# Patient Record
Sex: FEMALE | Race: WHITE | NOT HISPANIC OR LATINO
[De-identification: names, ages, dates, MRNs, and addresses within clinical notes are randomized per-mention and may not be internally consistent; named-entity substitution may affect disease eponyms.]

---

## 2017-10-04 PROBLEM — Z00.00 ENCOUNTER FOR PREVENTIVE HEALTH EXAMINATION: Status: ACTIVE | Noted: 2017-10-04

## 2017-10-16 ENCOUNTER — APPOINTMENT (OUTPATIENT)
Dept: VASCULAR SURGERY | Facility: CLINIC | Age: 82
End: 2017-10-16

## 2018-04-23 ENCOUNTER — MEDICATION RENEWAL (OUTPATIENT)
Age: 83
End: 2018-04-23

## 2018-04-30 ENCOUNTER — APPOINTMENT (OUTPATIENT)
Dept: VASCULAR SURGERY | Facility: CLINIC | Age: 83
End: 2018-04-30
Payer: MEDICARE

## 2018-04-30 VITALS — WEIGHT: 170 LBS | HEIGHT: 67 IN | BODY MASS INDEX: 26.68 KG/M2

## 2018-04-30 DIAGNOSIS — M79.669 PAIN IN UNSPECIFIED LOWER LEG: ICD-10-CM

## 2018-04-30 DIAGNOSIS — I99.9 UNSPECIFIED DISORDER OF CIRCULATORY SYSTEM: ICD-10-CM

## 2018-04-30 DIAGNOSIS — Z87.891 PERSONAL HISTORY OF NICOTINE DEPENDENCE: ICD-10-CM

## 2018-04-30 DIAGNOSIS — Z86.39 PERSONAL HISTORY OF OTHER ENDOCRINE, NUTRITIONAL AND METABOLIC DISEASE: ICD-10-CM

## 2018-04-30 DIAGNOSIS — Z78.9 OTHER SPECIFIED HEALTH STATUS: ICD-10-CM

## 2018-04-30 DIAGNOSIS — Z72.3 LACK OF PHYSICAL EXERCISE: ICD-10-CM

## 2018-04-30 DIAGNOSIS — Z80.3 FAMILY HISTORY OF MALIGNANT NEOPLASM OF BREAST: ICD-10-CM

## 2018-04-30 PROCEDURE — 93926 LOWER EXTREMITY STUDY: CPT

## 2018-04-30 PROCEDURE — 99213 OFFICE O/P EST LOW 20 MIN: CPT

## 2018-04-30 RX ORDER — ASPIRIN 81 MG/1
81 TABLET, COATED ORAL
Refills: 0 | Status: ACTIVE | COMMUNITY

## 2018-11-13 ENCOUNTER — MEDICATION RENEWAL (OUTPATIENT)
Age: 83
End: 2018-11-13

## 2019-06-10 ENCOUNTER — APPOINTMENT (OUTPATIENT)
Dept: VASCULAR SURGERY | Facility: CLINIC | Age: 84
End: 2019-06-10
Payer: MEDICARE

## 2019-06-10 VITALS — SYSTOLIC BLOOD PRESSURE: 104 MMHG | HEART RATE: 85 BPM | DIASTOLIC BLOOD PRESSURE: 75 MMHG

## 2019-06-10 VITALS — DIASTOLIC BLOOD PRESSURE: 71 MMHG | SYSTOLIC BLOOD PRESSURE: 144 MMHG

## 2019-06-10 PROCEDURE — 99213 OFFICE O/P EST LOW 20 MIN: CPT

## 2019-06-11 NOTE — PHYSICAL EXAM
[Normal Breath Sounds] : Normal breath sounds [Carotid Bruits] : carotid bruit  [JVD] : no jugular venous distention  [Normal Rate and Rhythm] : normal rate and rhythm [Alert] : alert [2+] : left 2+ [Oriented to Person] : oriented to person [Oriented to Place] : oriented to place [Oriented to Time] : oriented to time [de-identified] : appropriate [de-identified] : bilateral hands warm and pink [de-identified] : Awake and Alert

## 2019-06-11 NOTE — ASSESSMENT
[FreeTextEntry1] : 87 yo female s/p a left subclavian stent placement for symptomatic subclavian steal. She reports that she has pain in her left arm with use and frequent dizziness. She has normal pulses in the left arm and her blood pressure is greater in her left arm than the right. Clinically her stent appears to be patent. \par \par She will undergo a carotid duplex to evaluate for antegrade flow in the vertebral and patency of the stent.\par Continue ASA and Plavix\par Follow up when the results of the u/s are available.

## 2019-06-11 NOTE — HISTORY OF PRESENT ILLNESS
[FreeTextEntry1] : 89 yo female s/p a left subclavian stent for symptomatic steal syndrome. She reports that she has pain in her left arm while knitting. She also reports dizziness. She continue to take Aspirin and Plavix as described.

## 2019-06-11 NOTE — REVIEW OF SYSTEMS
[Fever] : no fever [Chills] : no chills [Limb Pain] : no limb pain [Limb Swelling] : no limb swelling [Feelings Of Weakness] : feelings of weakness

## 2019-07-08 ENCOUNTER — APPOINTMENT (OUTPATIENT)
Dept: VASCULAR SURGERY | Facility: CLINIC | Age: 84
End: 2019-07-08
Payer: MEDICARE

## 2019-07-08 VITALS — DIASTOLIC BLOOD PRESSURE: 70 MMHG | HEART RATE: 89 BPM | SYSTOLIC BLOOD PRESSURE: 132 MMHG

## 2019-07-08 VITALS — DIASTOLIC BLOOD PRESSURE: 70 MMHG | SYSTOLIC BLOOD PRESSURE: 160 MMHG

## 2019-07-08 PROCEDURE — 93882 EXTRACRANIAL UNI/LTD STUDY: CPT

## 2019-07-08 PROCEDURE — 99213 OFFICE O/P EST LOW 20 MIN: CPT

## 2019-07-08 NOTE — PHYSICAL EXAM
[Carotid Bruits] : carotid bruit  [Normal Breath Sounds] : Normal breath sounds [Normal Rate and Rhythm] : normal rate and rhythm [2+] : right 2+ [Oriented to Person] : oriented to person [Alert] : alert [Oriented to Place] : oriented to place [Oriented to Time] : oriented to time [JVD] : no jugular venous distention  [de-identified] : Awake and Alert [de-identified] : bilateral hands warm and pink [de-identified] : appropriate

## 2019-07-08 NOTE — HISTORY OF PRESENT ILLNESS
[FreeTextEntry1] : 87 yo female s/p a left subclavian stent for symptomatic steal syndrome. She has a known occlusion of the right ICA. She reports that she has pain in her left arm while knitting. She also reports dizziness. She continue to take Aspirin and Plavix as described. She is here for a left carotid duplex.

## 2019-07-08 NOTE — DATA REVIEWED
[FreeTextEntry1] : Carotid duplex - left -  widely patent subclavian stent , antegrade vertebral flow, moderate stenosis ICA

## 2019-07-08 NOTE — REVIEW OF SYSTEMS
[Dizziness] : dizziness [Limb Weakness] : limb weakness [Feelings Of Weakness] : feelings of weakness [Fever] : no fever [Chills] : no chills [Limb Pain] : no limb pain [Limb Swelling] : no limb swelling

## 2019-07-08 NOTE — ASSESSMENT
[FreeTextEntry1] : 89 yo female s/p a left subclavian stent placement for symptomatic subclavian steal. She reports that she has continued pain in her left arm with use and frequent dizziness. She has normal pulses in the left arm and her blood pressure is greater in her left arm than the right. Clinically her stent appears to be patent. She underwent a carotid duplex which demonstrated a widely patent stent with antegrade vertebral flow. I do not think there is an indication to intervene at this time\par \par \par Continue ASA and Plavix\par Follow up in 6 months

## 2020-01-06 ENCOUNTER — APPOINTMENT (OUTPATIENT)
Dept: VASCULAR SURGERY | Facility: CLINIC | Age: 85
End: 2020-01-06

## 2021-06-22 ENCOUNTER — NON-APPOINTMENT (OUTPATIENT)
Age: 86
End: 2021-06-22

## 2021-06-22 DIAGNOSIS — G45.8 OTHER TRANSIENT CEREBRAL ISCHEMIC ATTACKS AND RELATED SYNDROMES: ICD-10-CM

## 2021-07-28 ENCOUNTER — APPOINTMENT (OUTPATIENT)
Dept: VASCULAR SURGERY | Facility: CLINIC | Age: 86
End: 2021-07-28

## 2022-09-28 ENCOUNTER — TRANSCRIPTION ENCOUNTER (OUTPATIENT)
Age: 87
End: 2022-09-28

## 2023-02-18 ENCOUNTER — TRANSCRIPTION ENCOUNTER (OUTPATIENT)
Age: 88
End: 2023-02-18

## 2023-03-28 ENCOUNTER — TRANSCRIPTION ENCOUNTER (OUTPATIENT)
Age: 88
End: 2023-03-28

## 2023-03-29 ENCOUNTER — TRANSCRIPTION ENCOUNTER (OUTPATIENT)
Age: 88
End: 2023-03-29

## 2023-03-30 ENCOUNTER — APPOINTMENT (OUTPATIENT)
Dept: CARE COORDINATION | Facility: HOME HEALTH | Age: 88
End: 2023-03-30
Payer: MEDICARE

## 2023-03-30 VITALS
SYSTOLIC BLOOD PRESSURE: 140 MMHG | HEART RATE: 70 BPM | OXYGEN SATURATION: 98 % | DIASTOLIC BLOOD PRESSURE: 60 MMHG | RESPIRATION RATE: 16 BRPM

## 2023-03-30 DIAGNOSIS — Z86.73 PERSONAL HISTORY OF TRANSIENT ISCHEMIC ATTACK (TIA), AND CEREBRAL INFARCTION W/OUT RESIDUAL DEFICITS: ICD-10-CM

## 2023-03-30 DIAGNOSIS — F02.80 DEMENTIA WITH LEWY BODIES: ICD-10-CM

## 2023-03-30 DIAGNOSIS — G31.83 DEMENTIA WITH LEWY BODIES: ICD-10-CM

## 2023-03-30 PROCEDURE — 99495 TRANSJ CARE MGMT MOD F2F 14D: CPT

## 2023-03-31 PROBLEM — Z86.73 HISTORY OF CEREBROVASCULAR ACCIDENT: Status: RESOLVED | Noted: 2023-03-31 | Resolved: 2023-03-31

## 2023-03-31 PROBLEM — G31.83: Status: RESOLVED | Noted: 2023-03-31 | Resolved: 2023-03-31

## 2023-03-31 RX ORDER — GABAPENTIN 300 MG/1
300 CAPSULE ORAL
Refills: 0 | Status: COMPLETED | COMMUNITY
End: 2023-03-31

## 2023-03-31 RX ORDER — CLOPIDOGREL BISULFATE 75 MG/1
75 TABLET, FILM COATED ORAL DAILY
Qty: 30 | Refills: 6 | Status: COMPLETED | COMMUNITY
Start: 2020-02-20 | End: 2023-03-31

## 2023-03-31 RX ORDER — CLOPIDOGREL BISULFATE 75 MG/1
75 TABLET, FILM COATED ORAL DAILY
Qty: 30 | Refills: 6 | Status: COMPLETED | COMMUNITY
Start: 2018-11-13 | End: 2023-03-31

## 2023-03-31 RX ORDER — ACETAMINOPHEN 325 MG/1
325 TABLET, FILM COATED ORAL
Refills: 0 | Status: COMPLETED | COMMUNITY
End: 2023-03-31

## 2023-03-31 RX ORDER — SENNOSIDES 8.6 MG
8.6 TABLET ORAL
Refills: 0 | Status: COMPLETED | COMMUNITY
End: 2023-03-31

## 2023-03-31 RX ORDER — OXYCODONE HYDROCHLORIDE 5 MG/1
5 CAPSULE ORAL
Refills: 0 | Status: COMPLETED | COMMUNITY
End: 2023-03-31

## 2023-03-31 RX ORDER — CLOPIDOGREL 75 MG/1
75 TABLET, FILM COATED ORAL
Refills: 0 | Status: COMPLETED | COMMUNITY
End: 2023-03-31

## 2023-03-31 RX ORDER — METFORMIN HYDROCHLORIDE 500 MG/1
500 TABLET, COATED ORAL
Refills: 0 | Status: COMPLETED | COMMUNITY
End: 2023-03-31

## 2023-03-31 RX ORDER — ROSUVASTATIN CALCIUM 20 MG/1
20 TABLET, FILM COATED ORAL
Refills: 0 | Status: COMPLETED | COMMUNITY
End: 2023-03-31

## 2023-03-31 RX ORDER — AMLODIPINE BESYLATE 10 MG/1
10 TABLET ORAL
Refills: 0 | Status: COMPLETED | COMMUNITY
End: 2023-03-31

## 2023-03-31 RX ORDER — CLOPIDOGREL BISULFATE 75 MG/1
75 TABLET, FILM COATED ORAL DAILY
Qty: 30 | Refills: 6 | Status: COMPLETED | COMMUNITY
Start: 2020-10-20 | End: 2023-03-31

## 2023-03-31 RX ORDER — CLOPIDOGREL BISULFATE 75 MG/1
75 TABLET, FILM COATED ORAL DAILY
Qty: 30 | Refills: 6 | Status: COMPLETED | COMMUNITY
Start: 2021-06-22 | End: 2023-03-31

## 2023-03-31 RX ORDER — CLOPIDOGREL BISULFATE 75 MG/1
75 TABLET, FILM COATED ORAL DAILY
Qty: 30 | Refills: 5 | Status: COMPLETED | COMMUNITY
Start: 2018-04-23 | End: 2023-03-31

## 2023-03-31 RX ORDER — CLOPIDOGREL BISULFATE 75 MG/1
75 TABLET, FILM COATED ORAL DAILY
Qty: 30 | Refills: 6 | Status: COMPLETED | COMMUNITY
Start: 2019-07-15 | End: 2023-03-31

## 2023-03-31 NOTE — PLAN
[FreeTextEntry1] : - Patient referred to House Calls program and scheduled to be seen on 4/6\par \par - Patient verbalized understanding of plan as above, advised to call with any questions or concerns.  Yellow card with contact info given.\par \par

## 2023-03-31 NOTE — HISTORY OF PRESENT ILLNESS
[Family Member] : family member [FreeTextEntry1] : Follow-up for discharge from [facility] for CHF\par  [de-identified] : Patient is a 89 year y/o female enrolled in the [facility] program with a history of Lewybody Dementia, HTN, HLD, CVA, subclavian steel s/p stent on plavix, DM and former smoker recently admitted from 3/24/23-3/28/23 to [facility] for CHF.  Hospital record reviewed.  As per hospital discharge summary: \par \par "Admission History\par This is a 89YOF with a PMHx of Lewybody Dementia, HTN, HLD, CVA, subclavian steel s/p stent on plavix, DM and former smoker, she was brought by her family for increasing shortness of breath, ankle swelling and pain.  Patient has been here in the past multiple admissions for syncope and near syncope with extensive work-up. Patient on exam states the reason why she is here is because of bilateral lower extremity pain that has been on and off for the last few months. She describes it as burning sensation in both legs, worse in the lower extremity. No joint pain, no cramping pain. She states it's also been getting more swollen and worse with PT. She had PT for about 8 months in total on and off. She states she is done with PT as it doesn't benefit her. She also has shortness of breath on exertion, but this also has been going on for the last few months, it is not new for her. It has been happening more so now.  She denies any chest pain, dyspnea at rest, palpitation, N/V, F/C.\par \par In the ED, BMP 19630, Troponin 164 -> 157she was given 20 IV Lasix in the ED.  Cardiology was consulted in the ED and will see in consultation.\par CXR: Small bilateral pleural effusions, increased since prior study.\par Hospital Course\par This is a 89YOF with a PMHx of Lewybody Dementia, HTN, HLD, CVA, subclavian steel s/p stent on plavix, DM and former smoker, she was brought by her family for increasing shortness of breath, ankle swelling and pain.  Patient was admitted to telemetry for acute CHF.  She was diuresed with intravenous diuretics with improvement in shortness of breath and swelling lower extremity swelling.  Echocardiogram was done with preserved ejection fraction.  Patient was evaluated by cardiologist and was switched over to oral diuretics.  Patient was slowly weaned off of oxygen and tolerated that well.  She was discharged home with home health care services.  Patient was evaluated by physical therapy was walking 60 feet with rolling walker"\par \par Patient contact by Yadi Estrada RN on 3/29/23 and d/c instructions reviewed.  Discharge medications were reviewed and reconciled with the current medication list and medications in home.  Documentation can be found in clinical viewer.\par \par Patient evaluated in home and on arrival patient alert and oriented x3, and in no acute distress.  On arrival patient visualized walking with rolling walker, gait appears stable. Patient states she is feeling much better, and breathing has improved.  Patient denies shortness of breath at rest, but still having some shortness of breath with exertion.  CHF education given including low sodium diet, daily weights, and 1.5 liter fluid restriction.  Patient has scale in home.  Patient states her weight is improving, and today her weight was 142lbs, yesterday it was 144lbs.  Patient still with mild lower extremity edema,  Patient denies chest pain, palpitations, shortness of breath at rest, abdominal pain, nausea, vomiting, diarrhea, lightheaded or dizziness.\par \par

## 2023-03-31 NOTE — REVIEW OF SYSTEMS
[Fever] : no fever [Chills] : no chills [Fatigue] : no fatigue [Chest Pain] : no chest pain [Palpitations] : no palpitations [Lower Ext Edema] : lower extremity edema [Dyspnea on Exertion] : dyspnea on exertion [Abdominal Pain] : no abdominal pain [Nausea] : no nausea [Constipation] : no constipation [Diarrhea] : diarrhea [Negative] : Neurological

## 2023-03-31 NOTE — ASSESSMENT
[FreeTextEntry1] : Patient is a 89 year y/o female enrolled in the [facility] program with a history of Lewybody Dementia, HTN, HLD, CVA, subclavian steel s/p stent on plavix, DM and former smoker recently admitted from 3/24/23-3/28/23 to [facility] for CHF.  Hospital record reviewed.  As per hospital discharge summary: \par \par "Admission History\par This is a 89YOF with a PMHx of Lewybody Dementia, HTN, HLD, CVA, subclavian steel s/p stent on plavix, DM and former smoker, she was brought by her family for increasing shortness of breath, ankle swelling and pain.  Patient has been here in the past multiple admissions for syncope and near syncope with extensive work-up. Patient on exam states the reason why she is here is because of bilateral lower extremity pain that has been on and off for the last few months. She describes it as burning sensation in both legs, worse in the lower extremity. No joint pain, no cramping pain. She states it's also been getting more swollen and worse with PT. She had PT for about 8 months in total on and off. She states she is done with PT as it doesn't benefit her. She also has shortness of breath on exertion, but this also has been going on for the last few months, it is not new for her. It has been happening more so now.  She denies any chest pain, dyspnea at rest, palpitation, N/V, F/C.\par \par In the ED, BMP 19630, Troponin 164 -> 157she was given 20 IV Lasix in the ED.  Cardiology was consulted in the ED and will see in consultation.\par CXR: Small bilateral pleural effusions, increased since prior study.\par Hospital Course\par This is a 89YOF with a PMHx of Lewybody Dementia, HTN, HLD, CVA, subclavian steel s/p stent on plavix, DM and former smoker, she was brought by her family for increasing shortness of breath, ankle swelling and pain.  Patient was admitted to telemetry for acute CHF.  She was diuresed with intravenous diuretics with improvement in shortness of breath and swelling lower extremity swelling.  Echocardiogram was done with preserved ejection fraction.  Patient was evaluated by cardiologist and was switched over to oral diuretics.  Patient was slowly weaned off of oxygen and tolerated that well.  She was discharged home with home health care services.  Patient was evaluated by physical therapy was walking 60 feet with rolling walker"\par \par Patient contact by Yadi Estrada RN on 3/29/23 and d/c instructions reviewed.  Discharge medications were reviewed and reconciled with the current medication list and medications in home.  Documentation can be found in clinical viewer.\par \par Patient evaluated in home and on arrival patient alert and oriented x3, and in no acute distress.  On arrival patient visualized walking with rolling walker, gait appears stable. Patient states she is feeling much better, and breathing has improved.  Patient denies shortness of breath at rest, but still having some shortness of breath with exertion.  CHF education given including low sodium diet, daily weights, and 1.5 liter fluid restriction.  Patient has scale in home.  Patient states her weight is improving, and today her weight was 142lbs, yesterday it was 144lbs.  Patient still with mild lower extremity edema,  Patient denies chest pain, palpitations, shortness of breath at rest, abdominal pain, nausea, vomiting, diarrhea, lightheaded or dizziness.\par \par

## 2023-03-31 NOTE — PHYSICAL EXAM
[No Acute Distress] : no acute distress [Well Nourished] : well nourished [Well Developed] : well developed [Well-Appearing] : well-appearing [Normal Sclera/Conjunctiva] : normal sclera/conjunctiva [Supple] : supple [No Respiratory Distress] : no respiratory distress  [No Accessory Muscle Use] : no accessory muscle use [Clear to Auscultation] : lungs were clear to auscultation bilaterally [Normal Rate] : normal rate  [Regular Rhythm] : with a regular rhythm [Normal S1, S2] : normal S1 and S2 [Soft] : abdomen soft [Non Tender] : non-tender [Non-distended] : non-distended [Normal Gait] : normal gait [Normal Bowel Sounds] : normal bowel sounds [Normal Affect] : the affect was normal [Alert and Oriented x3] : oriented to person, place, and time [Normal Insight/Judgement] : insight and judgment were intact [de-identified] : Bilateral lower extremity edema

## 2023-04-04 ENCOUNTER — TRANSCRIPTION ENCOUNTER (OUTPATIENT)
Age: 88
End: 2023-04-04

## 2023-04-06 ENCOUNTER — APPOINTMENT (OUTPATIENT)
Dept: HOME HEALTH SERVICES | Facility: HOME HEALTH | Age: 88
End: 2023-04-06
Payer: MEDICARE

## 2023-04-06 VITALS — HEART RATE: 96 BPM | OXYGEN SATURATION: 97 %

## 2023-04-06 VITALS — WEIGHT: 139 LBS | BODY MASS INDEX: 22.34 KG/M2 | HEIGHT: 66 IN

## 2023-04-06 VITALS — SYSTOLIC BLOOD PRESSURE: 120 MMHG | DIASTOLIC BLOOD PRESSURE: 60 MMHG

## 2023-04-06 DIAGNOSIS — H35.30 UNSPECIFIED MACULAR DEGENERATION: ICD-10-CM

## 2023-04-06 PROCEDURE — 99344 HOME/RES VST NEW MOD MDM 60: CPT

## 2023-04-06 PROCEDURE — 99497 ADVNCD CARE PLAN 30 MIN: CPT

## 2023-04-06 NOTE — HISTORY OF PRESENT ILLNESS
[Patient] : patient [Family Member] : family member [FreeTextEntry1] : Gait instability [FreeTextEntry2] : Patient being seen for initial [facility] Calls visit, patient lives with daughter.  Patient was recently discharged from [facility] for CHF.  Patient alone most of the day due to DTR working.  Has First alert that she wears on her wrist.  \par Patient has someone coming to the home 3 days a week to be with her and be there while the patient showers.  \par Has difficulty with vision due to b/l macular degeneration.

## 2023-04-06 NOTE — PHYSICAL EXAM
[No Acute Distress] : no acute distress [Well Nourished] : well nourished [Normal Sclera/Conjunctiva] : normal sclera/conjunctiva [PERRL] : pupils equal, round and reactive to light [Normal Outer Ear/Nose] : the ears and nose were normal in appearance [No JVD] : no jugular venous distention [Supple] : the neck was supple [No Respiratory Distress] : no respiratory distress [Clear to Auscultation] : lungs were clear to auscultation bilaterally [Normal Rate] : heart rate was normal  [Regular Rhythm] : with a regular rhythm [Normal S1, S2] : normal S1 and S2 [Normal Bowel Sounds] : normal bowel sounds [Non Tender] : non-tender [Soft] : abdomen soft [Normal Gait] : normal gait [No Rash] : no rash [No Skin Lesions] : no skin lesions [Oriented x3] : oriented to person, place, and time

## 2023-04-06 NOTE — HEALTH RISK ASSESSMENT
[Some assistance needed] : dressing [Two or more falls in past year] : Patient reported two or more falls in the past year [Yes] : The patient does have visual impairment [Independent] : using telephone [Full assistance needed] : managing finances [TimeGetUpGo] : 5

## 2023-04-11 ENCOUNTER — TRANSCRIPTION ENCOUNTER (OUTPATIENT)
Age: 88
End: 2023-04-11

## 2023-05-08 ENCOUNTER — APPOINTMENT (OUTPATIENT)
Dept: HOME HEALTH SERVICES | Facility: HOME HEALTH | Age: 88
End: 2023-05-08

## 2023-05-08 VITALS
OXYGEN SATURATION: 96 % | SYSTOLIC BLOOD PRESSURE: 128 MMHG | DIASTOLIC BLOOD PRESSURE: 62 MMHG | RESPIRATION RATE: 16 BRPM | HEART RATE: 96 BPM

## 2023-06-13 ENCOUNTER — APPOINTMENT (OUTPATIENT)
Dept: HOME HEALTH SERVICES | Facility: HOME HEALTH | Age: 88
End: 2023-06-13

## 2023-07-25 ENCOUNTER — APPOINTMENT (OUTPATIENT)
Dept: HOME HEALTH SERVICES | Facility: HOME HEALTH | Age: 88
End: 2023-07-25

## 2023-08-08 ENCOUNTER — APPOINTMENT (OUTPATIENT)
Dept: HOME HEALTH SERVICES | Facility: HOME HEALTH | Age: 88
End: 2023-08-08
Payer: MEDICARE

## 2023-08-08 VITALS
OXYGEN SATURATION: 98 % | BODY MASS INDEX: 22.27 KG/M2 | WEIGHT: 138 LBS | DIASTOLIC BLOOD PRESSURE: 60 MMHG | SYSTOLIC BLOOD PRESSURE: 160 MMHG | HEART RATE: 66 BPM

## 2023-08-08 DIAGNOSIS — K59.09 OTHER CONSTIPATION: ICD-10-CM

## 2023-08-08 DIAGNOSIS — E78.5 HYPERLIPIDEMIA, UNSPECIFIED: ICD-10-CM

## 2023-08-08 PROCEDURE — 99349 HOME/RES VST EST MOD MDM 40: CPT

## 2023-08-08 NOTE — HISTORY OF PRESENT ILLNESS
[Patient] : patient [Family Member] : family member [FreeTextEntry1] : Gait instability [FreeTextEntry2] : Patient being seen for follow up House Calls visit, patient lives with daughter.  Patient alone most of the day due to DTR working.  Has First alert that she wears on her wrist.   Patient has someone coming to the home 3 days a week to be with her and be there while the patient showers.   Has difficulty with vision due to b/l macular degeneration.   Patient states she fell about 2 weeks ago coming in from the porch, pushed the button on her wrist to have police come pick her up.  States she hit her right knee and elbow.   States she has a new Cardiologist, Dr. Lynn, that she saw on 7/26/23. Has next apt in Oct.   Patient also states she has been constipation has not moved thaddeus in "a couple of days"

## 2023-08-09 ENCOUNTER — LABORATORY RESULT (OUTPATIENT)
Age: 88
End: 2023-08-09

## 2023-08-10 ENCOUNTER — LABORATORY RESULT (OUTPATIENT)
Age: 88
End: 2023-08-10

## 2023-08-11 ENCOUNTER — NON-APPOINTMENT (OUTPATIENT)
Age: 88
End: 2023-08-11

## 2023-08-11 RX ORDER — GLIPIZIDE 5 MG/1
5 TABLET ORAL DAILY
Qty: 90 | Refills: 3 | Status: ACTIVE | COMMUNITY

## 2023-08-11 RX ORDER — MELATONIN 2.5MG/10ML
600 LIQUID (ML) ORAL
Refills: 0 | Status: DISCONTINUED | COMMUNITY
End: 2023-08-11

## 2023-10-02 ENCOUNTER — APPOINTMENT (OUTPATIENT)
Dept: HOME HEALTH SERVICES | Facility: HOME HEALTH | Age: 88
End: 2023-10-02

## 2023-11-13 ENCOUNTER — LABORATORY RESULT (OUTPATIENT)
Age: 88
End: 2023-11-13

## 2023-12-05 ENCOUNTER — APPOINTMENT (OUTPATIENT)
Dept: HOME HEALTH SERVICES | Facility: HOME HEALTH | Age: 88
End: 2023-12-05
Payer: MEDICARE

## 2023-12-05 VITALS — OXYGEN SATURATION: 98 % | SYSTOLIC BLOOD PRESSURE: 160 MMHG | HEART RATE: 70 BPM | DIASTOLIC BLOOD PRESSURE: 80 MMHG

## 2023-12-05 DIAGNOSIS — H61.21 IMPACTED CERUMEN, RIGHT EAR: ICD-10-CM

## 2023-12-05 PROCEDURE — 99349 HOME/RES VST EST MOD MDM 40: CPT

## 2024-01-30 ENCOUNTER — APPOINTMENT (OUTPATIENT)
Dept: HOME HEALTH SERVICES | Facility: HOME HEALTH | Age: 89
End: 2024-01-30

## 2024-02-09 ENCOUNTER — NON-APPOINTMENT (OUTPATIENT)
Age: 89
End: 2024-02-09

## 2024-03-05 ENCOUNTER — RESULT REVIEW (OUTPATIENT)
Age: 89
End: 2024-03-05

## 2024-03-05 ENCOUNTER — TRANSCRIPTION ENCOUNTER (OUTPATIENT)
Age: 89
End: 2024-03-05

## 2024-03-05 ENCOUNTER — NON-APPOINTMENT (OUTPATIENT)
Age: 89
End: 2024-03-05

## 2024-03-06 ENCOUNTER — NON-APPOINTMENT (OUTPATIENT)
Age: 89
End: 2024-03-06

## 2024-03-07 ENCOUNTER — APPOINTMENT (OUTPATIENT)
Dept: HOME HEALTH SERVICES | Facility: HOME HEALTH | Age: 89
End: 2024-03-07

## 2024-03-07 VITALS
OXYGEN SATURATION: 97 % | TEMPERATURE: 98.2 F | DIASTOLIC BLOOD PRESSURE: 48 MMHG | HEART RATE: 77 BPM | RESPIRATION RATE: 16 BRPM | SYSTOLIC BLOOD PRESSURE: 102 MMHG

## 2024-03-18 ENCOUNTER — APPOINTMENT (OUTPATIENT)
Dept: HOME HEALTH SERVICES | Facility: HOME HEALTH | Age: 89
End: 2024-03-18
Payer: MEDICARE

## 2024-03-18 VITALS — SYSTOLIC BLOOD PRESSURE: 170 MMHG | HEART RATE: 78 BPM | DIASTOLIC BLOOD PRESSURE: 80 MMHG

## 2024-03-18 PROCEDURE — 99349 HOME/RES VST EST MOD MDM 40: CPT

## 2024-03-18 NOTE — HEALTH RISK ASSESSMENT
[One fall no injury in past year] : Patient reported one fall in the past year without injury [Yes] : The patient does have visual impairment [TimeGetUpGo] : 7

## 2024-03-18 NOTE — PHYSICAL EXAM
[No Acute Distress] : no acute distress [Normal Outer Ear/Nose] : the ears and nose were normal in appearance [Normal Sclera/Conjunctiva] : normal sclera/conjunctiva [No Respiratory Distress] : no respiratory distress [No JVD] : no jugular venous distention [Regular Rhythm] : with a regular rhythm [Normal Rate] : heart rate was normal  [Normal S1, S2] : normal S1 and S2 [Normal Bowel Sounds] : normal bowel sounds [Non Tender] : non-tender [Soft] : abdomen soft [No Rash] : no rash [de-identified] : shuffled gait [Oriented x3] : oriented to person, place, and time

## 2024-03-18 NOTE — HISTORY OF PRESENT ILLNESS
[Patient] : patient [FreeTextEntry1] : Gait instability [FreeTextEntry2] : Patient being seen for follow up, was put on Norvasc 5mg when discharged from the hospital.  Was seen by RN and BP was low so it was stopped.  Denies any falls Appetite is good Moving bowels well.  AOx3 Sitting in living room in chair for visit

## 2024-03-25 ENCOUNTER — APPOINTMENT (OUTPATIENT)
Dept: HOME HEALTH SERVICES | Facility: HOME HEALTH | Age: 89
End: 2024-03-25

## 2024-03-25 VITALS
HEART RATE: 62 BPM | RESPIRATION RATE: 16 BRPM | TEMPERATURE: 98.3 F | SYSTOLIC BLOOD PRESSURE: 158 MMHG | OXYGEN SATURATION: 100 % | DIASTOLIC BLOOD PRESSURE: 52 MMHG

## 2024-03-26 ENCOUNTER — NON-APPOINTMENT (OUTPATIENT)
Age: 89
End: 2024-03-26

## 2024-04-10 ENCOUNTER — NON-APPOINTMENT (OUTPATIENT)
Age: 89
End: 2024-04-10

## 2024-04-10 DIAGNOSIS — R30.0 DYSURIA: ICD-10-CM

## 2024-04-11 ENCOUNTER — TRANSCRIPTION ENCOUNTER (OUTPATIENT)
Age: 89
End: 2024-04-11

## 2024-04-11 ENCOUNTER — NON-APPOINTMENT (OUTPATIENT)
Age: 89
End: 2024-04-11

## 2024-04-12 ENCOUNTER — APPOINTMENT (OUTPATIENT)
Dept: HOME HEALTH SERVICES | Facility: HOME HEALTH | Age: 89
End: 2024-04-12

## 2024-04-12 RX ORDER — AMLODIPINE BESYLATE 5 MG/1
5 TABLET ORAL DAILY
Qty: 90 | Refills: 3 | Status: DISCONTINUED | COMMUNITY
Start: 2024-03-07 | End: 2024-04-12

## 2024-04-13 ENCOUNTER — NON-APPOINTMENT (OUTPATIENT)
Age: 89
End: 2024-04-13

## 2024-04-15 ENCOUNTER — APPOINTMENT (OUTPATIENT)
Dept: HOME HEALTH SERVICES | Facility: HOME HEALTH | Age: 89
End: 2024-04-15
Payer: MEDICARE

## 2024-04-15 ENCOUNTER — APPOINTMENT (OUTPATIENT)
Dept: HOME HEALTH SERVICES | Facility: HOME HEALTH | Age: 89
End: 2024-04-15

## 2024-04-15 VITALS — SYSTOLIC BLOOD PRESSURE: 150 MMHG | DIASTOLIC BLOOD PRESSURE: 78 MMHG | OXYGEN SATURATION: 98 % | HEART RATE: 66 BPM

## 2024-04-15 DIAGNOSIS — F02.A0: ICD-10-CM

## 2024-04-15 DIAGNOSIS — E11.9 TYPE 2 DIABETES MELLITUS W/OUT COMPLICATIONS: ICD-10-CM

## 2024-04-15 PROCEDURE — 99349 HOME/RES VST EST MOD MDM 40: CPT

## 2024-04-15 NOTE — PHYSICAL EXAM
[No Acute Distress] : no acute distress [Normal Sclera/Conjunctiva] : normal sclera/conjunctiva [Normal Outer Ear/Nose] : the ears and nose were normal in appearance [No JVD] : no jugular venous distention [No Respiratory Distress] : no respiratory distress [Normal Rate] : heart rate was normal  [Regular Rhythm] : with a regular rhythm [Normal S1, S2] : normal S1 and S2 [Normal Bowel Sounds] : normal bowel sounds [Non Tender] : non-tender [Soft] : abdomen soft [No Rash] : no rash [Oriented x3] : oriented to person, place, and time [de-identified] : shuffled gait

## 2024-04-15 NOTE — HISTORY OF PRESENT ILLNESS
[Patient] : patient [Family Member] : family member [FreeTextEntry1] : Gait instability [FreeTextEntry2] : Patient being seen for post discharge after being in observation for feeling dizzy and being dehydrated.  Not currently on Norvasc. Patient does continue to c/o feeling dizzy in the mornings. Patient is not drinking more water Moving bowels well Appetite is fair sitting in chair in living room for visit Ne behavior issues

## 2024-04-16 ENCOUNTER — RX RENEWAL (OUTPATIENT)
Age: 89
End: 2024-04-16

## 2024-04-16 RX ORDER — FUROSEMIDE 20 MG/1
20 TABLET ORAL DAILY
Qty: 90 | Refills: 0 | Status: ACTIVE | COMMUNITY
Start: 1900-01-01 | End: 1900-01-01

## 2024-04-16 RX ORDER — RIVASTIGMINE TARTRATE 3 MG/1
3 CAPSULE ORAL TWICE DAILY
Qty: 180 | Refills: 0 | Status: ACTIVE | COMMUNITY
Start: 1900-01-01 | End: 1900-01-01

## 2024-04-16 RX ORDER — ROSUVASTATIN CALCIUM 40 MG/1
40 TABLET, FILM COATED ORAL DAILY
Qty: 90 | Refills: 3 | Status: ACTIVE | COMMUNITY
Start: 2023-03-31 | End: 1900-01-01

## 2024-05-14 ENCOUNTER — APPOINTMENT (OUTPATIENT)
Dept: HOME HEALTH SERVICES | Facility: HOME HEALTH | Age: 89
End: 2024-05-14

## 2024-05-14 VITALS
RESPIRATION RATE: 18 BRPM | TEMPERATURE: 98.1 F | OXYGEN SATURATION: 97 % | DIASTOLIC BLOOD PRESSURE: 76 MMHG | HEART RATE: 72 BPM | SYSTOLIC BLOOD PRESSURE: 138 MMHG

## 2024-05-15 ENCOUNTER — NON-APPOINTMENT (OUTPATIENT)
Age: 89
End: 2024-05-15

## 2024-06-28 ENCOUNTER — APPOINTMENT (OUTPATIENT)
Dept: HOME HEALTH SERVICES | Facility: HOME HEALTH | Age: 89
End: 2024-06-28

## 2024-06-28 VITALS — HEART RATE: 76 BPM | SYSTOLIC BLOOD PRESSURE: 140 MMHG | OXYGEN SATURATION: 98 % | DIASTOLIC BLOOD PRESSURE: 80 MMHG

## 2024-06-28 DIAGNOSIS — I50.9 HEART FAILURE, UNSPECIFIED: ICD-10-CM

## 2024-06-28 DIAGNOSIS — I10 ESSENTIAL (PRIMARY) HYPERTENSION: ICD-10-CM

## 2024-06-28 PROCEDURE — 99349 HOME/RES VST EST MOD MDM 40: CPT

## 2024-07-05 ENCOUNTER — APPOINTMENT (OUTPATIENT)
Dept: HOME HEALTH SERVICES | Facility: HOME HEALTH | Age: 89
End: 2024-07-05
Payer: MEDICARE

## 2024-07-05 VITALS — HEART RATE: 72 BPM | DIASTOLIC BLOOD PRESSURE: 70 MMHG | OXYGEN SATURATION: 97 % | SYSTOLIC BLOOD PRESSURE: 120 MMHG

## 2024-07-05 DIAGNOSIS — I50.9 HEART FAILURE, UNSPECIFIED: ICD-10-CM

## 2024-07-05 DIAGNOSIS — F02.A0: ICD-10-CM

## 2024-07-05 DIAGNOSIS — I10 ESSENTIAL (PRIMARY) HYPERTENSION: ICD-10-CM

## 2024-07-05 DIAGNOSIS — E11.9 TYPE 2 DIABETES MELLITUS W/OUT COMPLICATIONS: ICD-10-CM

## 2024-07-05 PROCEDURE — 99349 HOME/RES VST EST MOD MDM 40: CPT

## 2024-08-02 ENCOUNTER — NON-APPOINTMENT (OUTPATIENT)
Age: 89
End: 2024-08-02

## 2024-08-02 ENCOUNTER — TRANSCRIPTION ENCOUNTER (OUTPATIENT)
Age: 89
End: 2024-08-02

## 2024-08-03 ENCOUNTER — TRANSCRIPTION ENCOUNTER (OUTPATIENT)
Age: 89
End: 2024-08-03

## 2024-08-06 ENCOUNTER — NON-APPOINTMENT (OUTPATIENT)
Age: 89
End: 2024-08-06

## 2024-08-06 ENCOUNTER — TRANSCRIPTION ENCOUNTER (OUTPATIENT)
Age: 89
End: 2024-08-06

## 2024-08-08 ENCOUNTER — NON-APPOINTMENT (OUTPATIENT)
Age: 89
End: 2024-08-08

## 2024-08-09 ENCOUNTER — NON-APPOINTMENT (OUTPATIENT)
Age: 89
End: 2024-08-09

## 2024-08-09 ENCOUNTER — TRANSCRIPTION ENCOUNTER (OUTPATIENT)
Age: 89
End: 2024-08-09

## 2024-08-15 ENCOUNTER — TRANSCRIPTION ENCOUNTER (OUTPATIENT)
Age: 89
End: 2024-08-15

## 2024-08-20 ENCOUNTER — NON-APPOINTMENT (OUTPATIENT)
Age: 89
End: 2024-08-20

## 2024-08-28 ENCOUNTER — NON-APPOINTMENT (OUTPATIENT)
Age: 89
End: 2024-08-28

## 2024-08-29 ENCOUNTER — APPOINTMENT (OUTPATIENT)
Dept: HOME HEALTH SERVICES | Facility: HOME HEALTH | Age: 89
End: 2024-08-29

## 2024-08-29 VITALS
HEART RATE: 72 BPM | DIASTOLIC BLOOD PRESSURE: 70 MMHG | RESPIRATION RATE: 16 BRPM | OXYGEN SATURATION: 96 % | TEMPERATURE: 98.2 F | SYSTOLIC BLOOD PRESSURE: 138 MMHG

## 2024-08-29 RX ORDER — GLUCOSAMINE HCL 500 MG
400 TABLET ORAL
Refills: 0 | Status: ACTIVE | COMMUNITY
Start: 2024-08-29

## 2024-08-29 RX ORDER — AMLODIPINE BESYLATE 2.5 MG/1
2.5 TABLET ORAL DAILY
Refills: 0 | Status: ACTIVE | COMMUNITY
Start: 2024-08-29

## 2024-08-29 RX ORDER — ACETAMINOPHEN 500 MG/1
500 TABLET, COATED ORAL
Refills: 0 | Status: ACTIVE | COMMUNITY
Start: 2024-08-29

## 2024-08-29 RX ORDER — CHLORHEXIDINE GLUCONATE 4 %
325 (65 FE) LIQUID (ML) TOPICAL DAILY
Refills: 0 | Status: ACTIVE | COMMUNITY
Start: 2024-08-29

## 2024-10-14 ENCOUNTER — APPOINTMENT (OUTPATIENT)
Dept: HOME HEALTH SERVICES | Facility: HOME HEALTH | Age: 89
End: 2024-10-14
Payer: MEDICARE

## 2024-10-14 VITALS — DIASTOLIC BLOOD PRESSURE: 84 MMHG | HEART RATE: 70 BPM | OXYGEN SATURATION: 99 % | SYSTOLIC BLOOD PRESSURE: 150 MMHG

## 2024-10-14 DIAGNOSIS — F02.A0: ICD-10-CM

## 2024-10-14 DIAGNOSIS — I50.9 HEART FAILURE, UNSPECIFIED: ICD-10-CM

## 2024-10-14 DIAGNOSIS — E11.9 TYPE 2 DIABETES MELLITUS W/OUT COMPLICATIONS: ICD-10-CM

## 2024-10-14 DIAGNOSIS — I10 ESSENTIAL (PRIMARY) HYPERTENSION: ICD-10-CM

## 2024-10-14 DIAGNOSIS — N39.0 URINARY TRACT INFECTION, SITE NOT SPECIFIED: ICD-10-CM

## 2024-10-14 PROCEDURE — 99349 HOME/RES VST EST MOD MDM 40: CPT

## 2024-10-14 RX ORDER — CIPROFLOXACIN HYDROCHLORIDE 500 MG/1
500 TABLET, FILM COATED ORAL
Qty: 14 | Refills: 0 | Status: ACTIVE | COMMUNITY
Start: 2024-10-14 | End: 1900-01-01

## 2024-10-15 ENCOUNTER — LABORATORY RESULT (OUTPATIENT)
Age: 89
End: 2024-10-15

## 2024-10-18 DIAGNOSIS — M25.551 PAIN IN RIGHT HIP: ICD-10-CM

## 2024-10-18 RX ORDER — TRAMADOL HYDROCHLORIDE 50 MG/1
50 TABLET, COATED ORAL
Qty: 90 | Refills: 0 | Status: ACTIVE | COMMUNITY
Start: 2024-10-18 | End: 1900-01-01

## 2024-10-18 RX ORDER — TAPENTADOL HYDROCHLORIDE 50 MG/1
50 TABLET, FILM COATED ORAL DAILY
Qty: 30 | Refills: 0 | Status: DISCONTINUED | COMMUNITY
Start: 2024-10-18 | End: 2024-10-18

## 2024-10-21 ENCOUNTER — NON-APPOINTMENT (OUTPATIENT)
Age: 89
End: 2024-10-21